# Patient Record
Sex: FEMALE | Race: BLACK OR AFRICAN AMERICAN | NOT HISPANIC OR LATINO | Employment: UNEMPLOYED | URBAN - METROPOLITAN AREA
[De-identification: names, ages, dates, MRNs, and addresses within clinical notes are randomized per-mention and may not be internally consistent; named-entity substitution may affect disease eponyms.]

---

## 2024-08-08 ENCOUNTER — HOSPITAL ENCOUNTER (EMERGENCY)
Facility: HOSPITAL | Age: 39
Discharge: HOME | End: 2024-08-08
Attending: FAMILY MEDICINE

## 2024-08-08 VITALS
BODY MASS INDEX: 18.93 KG/M2 | HEART RATE: 82 BPM | DIASTOLIC BLOOD PRESSURE: 88 MMHG | RESPIRATION RATE: 16 BRPM | OXYGEN SATURATION: 99 % | SYSTOLIC BLOOD PRESSURE: 117 MMHG | TEMPERATURE: 98 F | WEIGHT: 117.81 LBS | HEIGHT: 66 IN

## 2024-08-08 DIAGNOSIS — M79.672 LEFT FOOT PAIN: ICD-10-CM

## 2024-08-08 DIAGNOSIS — S92.902A CLOSED FRACTURE OF LEFT FOOT, INITIAL ENCOUNTER: Primary | ICD-10-CM

## 2024-08-08 LAB — B-HCG UR QL: NEGATIVE

## 2024-08-08 PROCEDURE — 99283 EMERGENCY DEPT VISIT LOW MDM: CPT | Mod: 25

## 2024-08-08 PROCEDURE — 29515 APPLICATION SHORT LEG SPLINT: CPT | Mod: LT

## 2024-08-08 PROCEDURE — 81025 URINE PREGNANCY TEST: CPT | Performed by: NURSE PRACTITIONER

## 2024-08-08 RX ORDER — HYDROCODONE BITARTRATE AND ACETAMINOPHEN 5; 325 MG/1; MG/1
1 TABLET ORAL EVERY 6 HOURS PRN
Qty: 12 TABLET | Refills: 0 | Status: SHIPPED | OUTPATIENT
Start: 2024-08-08

## 2024-08-08 NOTE — Clinical Note
"Carine Reyestristan Reyes was seen and treated in our emergency department on 8/8/2024.  She may return to work after being cleared by follow-up physician .       If you have any questions or concerns, please don't hesitate to call.      Jose Martin Stern, GIORGIO"
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
## 2024-12-04 ENCOUNTER — HOSPITAL ENCOUNTER (EMERGENCY)
Facility: HOSPITAL | Age: 39
Discharge: HOME | End: 2024-12-04
Attending: EMERGENCY MEDICINE
Payer: COMMERCIAL

## 2024-12-04 VITALS
SYSTOLIC BLOOD PRESSURE: 132 MMHG | BODY MASS INDEX: 18.16 KG/M2 | RESPIRATION RATE: 19 BRPM | DIASTOLIC BLOOD PRESSURE: 72 MMHG | HEIGHT: 66 IN | HEART RATE: 83 BPM | WEIGHT: 113 LBS | TEMPERATURE: 98 F | OXYGEN SATURATION: 98 %
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
## 2024-12-04 DIAGNOSIS — S42.254A CLOSED NONDISPLACED FRACTURE OF GREATER TUBEROSITY OF RIGHT HUMERUS, INITIAL ENCOUNTER: ICD-10-CM

## 2024-12-04 LAB — B-HCG UR QL: NEGATIVE

## 2024-12-04 PROCEDURE — 99284 EMERGENCY DEPT VISIT MOD MDM: CPT | Mod: 25

## 2024-12-04 PROCEDURE — 81025 URINE PREGNANCY TEST: CPT | Performed by: NURSE PRACTITIONER

## 2024-12-04 PROCEDURE — 25000003 PHARM REV CODE 250: Performed by: NURSE PRACTITIONER

## 2024-12-04 RX ORDER — KETOROLAC TROMETHAMINE 10 MG/1
10 TABLET, FILM COATED ORAL 3 TIMES DAILY
Qty: 15 TABLET | Refills: 0 | Status: SHIPPED | OUTPATIENT
Start: 2024-12-04 | End: 2024-12-09

## 2024-12-04 RX ORDER — IBUPROFEN 800 MG/1
800 TABLET ORAL
Status: COMPLETED | OUTPATIENT
Start: 2024-12-04 | End: 2024-12-04

## 2024-12-04 RX ORDER — CYCLOBENZAPRINE HCL 5 MG
10 TABLET ORAL
Status: COMPLETED | OUTPATIENT
Start: 2024-12-04 | End: 2024-12-04

## 2024-12-04 RX ORDER — HYDROCODONE BITARTRATE AND ACETAMINOPHEN 5; 325 MG/1; MG/1
1 TABLET ORAL EVERY 6 HOURS PRN
Qty: 8 TABLET | Refills: 0 | Status: SHIPPED | OUTPATIENT
Start: 2024-12-04

## 2024-12-04 RX ORDER — CYCLOBENZAPRINE HCL 10 MG
10 TABLET ORAL 3 TIMES DAILY PRN
Qty: 15 TABLET | Refills: 0 | Status: SHIPPED | OUTPATIENT
Start: 2024-12-04 | End: 2024-12-09

## 2024-12-04 RX ADMIN — CYCLOBENZAPRINE HYDROCHLORIDE 10 MG: 5 TABLET, FILM COATED ORAL at 07:12

## 2024-12-04 RX ADMIN — IBUPROFEN 800 MG: 800 TABLET, FILM COATED ORAL at 07:12

## 2024-12-04 NOTE — Clinical Note
"Carine Reyestristan Reyes was seen and treated in our emergency department on 12/4/2024.  She may return to work on 12/07/2024.       If you have any questions or concerns, please don't hesitate to call.      Ward Lr NP"

## 2024-12-06 ENCOUNTER — TELEPHONE (OUTPATIENT)
Dept: ORTHOPEDICS | Facility: CLINIC | Age: 39
End: 2024-12-06
Payer: COMMERCIAL

## 2024-12-06 NOTE — TELEPHONE ENCOUNTER
Phoned patient to schedule her ER follow up appointment. Left a message for patient to call back to schedule.

## 2024-12-06 NOTE — TELEPHONE ENCOUNTER
----- Message from Paddy Cardona PA-C sent at 12/5/2024  4:08 PM CST -----  Regarding: RE: New Referrals  Can come see me next week  ----- Message -----  From: Ata Gupta MA  Sent: 12/5/2024  12:01 PM CST  To: Elise Russo MA; Paddy Cardona PA-C; #  Subject: FW: New Referrals                                When should patient follow up in clinic?  ----- Message -----  From: Monica Byers  Sent: 12/5/2024   8:39 AM CST  To: [facility]  Subject: New Referrals                                    Closed nondisplaced fracture of greater tuberosity of right humerus
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
## 2024-12-10 ENCOUNTER — TELEPHONE (OUTPATIENT)
Dept: ORTHOPEDICS | Facility: CLINIC | Age: 39
End: 2024-12-10
Payer: COMMERCIAL

## 2024-12-10 NOTE — TELEPHONE ENCOUNTER
Phoned patient to schedule her ER follow up appointment. Numbers listed are not in service at this time.

## 2024-12-11 ENCOUNTER — TELEPHONE (OUTPATIENT)
Dept: ORTHOPEDICS | Facility: CLINIC | Age: 39
End: 2024-12-11
Payer: COMMERCIAL

## 2024-12-11 NOTE — TELEPHONE ENCOUNTER
Phoned patient to schedule her ER follow up appointment. Numbers listed are not in service at this time.        Contact Information  [phone] (Home Phone)   [phone] (Mobile)   Alternate    Marquis Dominguez (Significant other)  [phone] (Mobile)      RE: New Referrals  Received: 6 days ago  Paddy Cardona PA-C Coleman, Deashley, MA; Elise Russo MA  Can come see me next week        Previous Messages       ----- Message -----  From: Ata Gupta MA  Sent: 12/5/2024  12:01 PM CST  To: Elise Russo MA; Paddy Cardona PA-C; *  Subject: FW: New Referrals                                When should patient follow up in clinic?  ----- Message -----  From: Monica Byers  Sent: 12/5/2024   8:39 AM CST  To: [facility]  Subject: New Referrals                                    Closed nondisplaced fracture of greater tuberosity of right humerus

## 2024-12-12 ENCOUNTER — TELEPHONE (OUTPATIENT)
Dept: ORTHOPEDICS | Facility: CLINIC | Age: 39
End: 2024-12-12
Payer: COMMERCIAL
